# Patient Record
Sex: FEMALE | Race: OTHER | Employment: UNEMPLOYED | URBAN - METROPOLITAN AREA
[De-identification: names, ages, dates, MRNs, and addresses within clinical notes are randomized per-mention and may not be internally consistent; named-entity substitution may affect disease eponyms.]

---

## 2024-11-06 ENCOUNTER — HOSPITAL ENCOUNTER (EMERGENCY)
Facility: HOSPITAL | Age: 24
Discharge: HOME | End: 2024-11-06
Attending: EMERGENCY MEDICINE
Payer: COMMERCIAL

## 2024-11-06 VITALS
SYSTOLIC BLOOD PRESSURE: 138 MMHG | OXYGEN SATURATION: 99 % | RESPIRATION RATE: 18 BRPM | TEMPERATURE: 98.4 F | HEART RATE: 82 BPM | DIASTOLIC BLOOD PRESSURE: 88 MMHG

## 2024-11-06 DIAGNOSIS — S09.90XA CLOSED HEAD INJURY WITHOUT LOSS OF CONSCIOUSNESS, INITIAL ENCOUNTER: ICD-10-CM
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
## 2024-11-06 PROCEDURE — 99284 EMERGENCY DEPT VISIT MOD MDM: CPT

## 2024-11-06 PROCEDURE — 99283 EMERGENCY DEPT VISIT LOW MDM: CPT | Performed by: PHYSICIAN ASSISTANT

## 2024-11-06 RX ORDER — ACETAMINOPHEN 325 MG/1
650 TABLET ORAL ONCE
Status: COMPLETED | OUTPATIENT
Start: 2024-11-06 | End: 2024-11-06

## 2024-11-06 RX ORDER — IBUPROFEN 400 MG/1
400 TABLET, FILM COATED ORAL EVERY 6 HOURS PRN
Qty: 12 TABLET | Refills: 0 | Status: SHIPPED | OUTPATIENT
Start: 2024-11-06

## 2024-11-06 RX ORDER — ACETAMINOPHEN 500 MG
500 TABLET ORAL EVERY 6 HOURS PRN
Qty: 30 TABLET | Refills: 0 | Status: SHIPPED | OUTPATIENT
Start: 2024-11-06

## 2024-11-06 RX ADMIN — ACETAMINOPHEN 650 MG: 325 TABLET ORAL at 17:54
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
## 2024-11-06 NOTE — Clinical Note
Елена Medina was seen and treated in our emergency department on 11/6/2024.                Diagnosis:     Елена  may return to work on return date.    She may return on this date: 11/08/2024         If you have any questions or concerns, please don't hesitate to call.      Annette Baker PA-C    ______________________________           _______________          _______________  Hospital Representative                              Date                                Time